# Patient Record
Sex: MALE | Race: BLACK OR AFRICAN AMERICAN | NOT HISPANIC OR LATINO | URBAN - METROPOLITAN AREA
[De-identification: names, ages, dates, MRNs, and addresses within clinical notes are randomized per-mention and may not be internally consistent; named-entity substitution may affect disease eponyms.]

---
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
## 2021-10-28 ENCOUNTER — EMERGENCY (EMERGENCY)
Facility: HOSPITAL | Age: 21
LOS: 0 days | Discharge: HOME | End: 2021-10-28
Attending: EMERGENCY MEDICINE
Payer: MEDICAID

## 2021-10-28 VITALS
DIASTOLIC BLOOD PRESSURE: 65 MMHG | TEMPERATURE: 100 F | SYSTOLIC BLOOD PRESSURE: 96 MMHG | RESPIRATION RATE: 18 BRPM | OXYGEN SATURATION: 98 % | WEIGHT: 154.98 LBS | HEART RATE: 90 BPM | HEIGHT: 60 IN

## 2021-10-28 DIAGNOSIS — M62.838 OTHER MUSCLE SPASM: ICD-10-CM

## 2021-10-28 DIAGNOSIS — M79.622 PAIN IN LEFT UPPER ARM: ICD-10-CM

## 2021-10-28 PROCEDURE — 99284 EMERGENCY DEPT VISIT MOD MDM: CPT

## 2021-10-28 PROCEDURE — 93971 EXTREMITY STUDY: CPT | Mod: 26,LT

## 2021-10-28 RX ORDER — IBUPROFEN 200 MG
600 TABLET ORAL ONCE
Refills: 0 | Status: COMPLETED | OUTPATIENT
Start: 2021-10-28 | End: 2021-10-28

## 2021-10-28 RX ORDER — IBUPROFEN 200 MG
1 TABLET ORAL
Qty: 20 | Refills: 0
Start: 2021-10-28 | End: 2021-11-01

## 2021-10-28 RX ADMIN — Medication 600 MILLIGRAM(S): at 21:16

## 2021-10-28 NOTE — ED ADULT TRIAGE NOTE - CHIEF COMPLAINT QUOTE
c/o pain and swelling to left upper arm x 1 day. states it happened while he was sleeping. also c/o tingling sensation to left fingers. denies SOB/CP/ha/n/v. denies any recent trauma or injury to area. pt sent from urgent care with a referral to go to the ED to r/o DVT. c/o pain and swelling to left upper arm x 1 day. states it happened while he was sleeping. also c/o tingling sensation to left fingers. denies SOB/CP/ha/n/v. denies any recent trauma or injury to area.

## 2021-10-28 NOTE — ED ADULT NURSE NOTE - OBJECTIVE STATEMENT
pt came to the ED after referral from , c/o of pain in left upper arm 10/10, started this morning, light swelling and tenderness seen at sight. pt experiencing chills abd ache throughout the body. no PMH, denies SOB,N. V, HA, CP. no recent trauma stated.

## 2021-10-28 NOTE — ED PROVIDER NOTE - NSFOLLOWUPINSTRUCTIONS_ED_ALL_ED_FT
Follow up with orthopedist if persistent pain. You can use heat packs, gentle massage, motrin 600mg every 6-8 hours for pain and/or Tylenol 650 mg every 4 hours for pain/fever (Max 4g/day of tylenol)    return for any new or worsening symptoms.

## 2021-10-28 NOTE — ED PROVIDER NOTE - CLINICAL SUMMARY MEDICAL DECISION MAKING FREE TEXT BOX
Pt likely with muscle strain/spasm. will do sono given sent in for sono. heat pack, nsaids, and fu with pmd. Pt likely with muscle strain/spasm. will do sono given sent in for sono. heat pack, nsaids, and fu with pmd.    us neg for dvt, for heat packs, analgesisa, fu with ortho if persistent.

## 2021-10-28 NOTE — ED PROVIDER NOTE - PATIENT PORTAL LINK FT
You can access the FollowMyHealth Patient Portal offered by [facility] by registering at the following website: [email or web address]. By joining [employer]’s FollowMyHealth portal, you will also be able to view your health information using other applications (apps) compatible with our system.

## 2021-10-28 NOTE — ED PROVIDER NOTE - CARE PROVIDER_API CALL
Kasi Carey (DO)  Orthopaedic Surgery  [address] [place] [address]  Phone: [phone]  Fax: [phone]  Follow Up Time:

## 2021-10-28 NOTE — ED ADULT NURSE NOTE - CHIEF COMPLAINT QUOTE
pt sent from urgent care with a referral to go to the ED to r/o DVT. c/o pain and swelling to left upper arm x 1 day. states it happened while he was sleeping. also c/o tingling sensation to left fingers. denies SOB/CP/ha/n/v. denies any recent trauma or injury to area.

## 2021-10-28 NOTE — ED PROVIDER NOTE - OBJECTIVE STATEMENT
20yo male with no pertinent pmh present with left medial distal upper arm pain since waking this morning. denies trauma, heavy lifting, exercise. pt sent in by  for r/o dvt. pt feels like spasm. denies fever, cp, sob, palpitations, leg swelling, h/o DVT/PE, covid, family history of dvt, travel/immoblizatoin, arm swelling.     No fever/chills, No photophobia/eye pain/changes in vision, No ear pain/sore throat, No chest pain/palpitations, no SOB/cough, No abdominal pain, No N/V/D, no dysuria/frequency/discharge, No neck/back pain, no rash, no changes in neurological status/function. + ARM PAIN

## 2021-10-29 ENCOUNTER — EMERGENCY (EMERGENCY)
Facility: HOSPITAL | Age: 21
LOS: 0 days | Discharge: HOME | End: 2021-10-29
Payer: MEDICAID

## 2021-10-29 VITALS
WEIGHT: 149.91 LBS | DIASTOLIC BLOOD PRESSURE: 61 MMHG | HEIGHT: 73 IN | RESPIRATION RATE: 18 BRPM | OXYGEN SATURATION: 98 % | TEMPERATURE: 98 F | HEART RATE: 78 BPM | SYSTOLIC BLOOD PRESSURE: 99 MMHG

## 2021-10-29 DIAGNOSIS — L02.414 CUTANEOUS ABSCESS OF LEFT UPPER LIMB: ICD-10-CM

## 2021-10-29 DIAGNOSIS — M79.602 PAIN IN LEFT ARM: ICD-10-CM

## 2021-10-29 PROCEDURE — 99283 EMERGENCY DEPT VISIT LOW MDM: CPT

## 2021-10-29 RX ORDER — CEPHALEXIN 500 MG
1 CAPSULE ORAL
Qty: 28 | Refills: 0
Start: 2021-10-29 | End: 2021-11-04

## 2021-10-29 NOTE — ED ADULT TRIAGE NOTE - CHIEF COMPLAINT QUOTE
c/o l arm pain since yesterday seen here last night for same c/o states sono negative for dvt rx'd ibuprofen but states not helping

## 2021-10-29 NOTE — ED ADULT NURSE NOTE - OBJECTIVE STATEMENT
c/o l arm pain since yesterday seen here last night for same c/o states sono negative for dvt rx'd ibuprofen but states not helping. states that it is more swollen from yesterday , also had tingling and numbness in left hand. + Radial pulse to L- hand

## 2021-11-12 NOTE — ED PROVIDER NOTE - OBJECTIVE STATEMENT
22 y/o M with no PMHX BIB mom c/o left upper arm pain, pt was seen yesterday for same concerns, upper extremity doppler negative for DVT as per pt pain is worse with touch and movement no associated symptoms denies trauma, numbness, fever and other concerns.

## 2021-11-12 NOTE — ED PROVIDER NOTE - MUSCULOSKELETAL, MLM
Spine appears normal, range of motion is not limited, left upper arm TTP medially with no swelling, erythema, minimal induration noted non fluctuant.

## 2021-11-12 NOTE — ED PROVIDER NOTE - CLINICAL SUMMARY MEDICAL DECISION MAKING FREE TEXT BOX
20 y/o M with left arm pain possible cutaneous abscess seen yesterday for same concerns with negative doppler, refused to take meds but agree to be sent home on abx and warm compress application to site and return to ED if worsening symptoms. He will f/u with PMD.

## 2021-11-12 NOTE — ED PROVIDER NOTE - PATIENT PORTAL LINK FT
You can access the FollowMyHealth Patient Portal offered by [facility] by registering at the following website: [email or web address]. By joining [employer]’s FollowMyHealth portal, you will also be able to view your health information using other applications (apps) compatible with our system.
